# Patient Record
Sex: FEMALE | Race: BLACK OR AFRICAN AMERICAN | NOT HISPANIC OR LATINO | Employment: PART TIME | URBAN - METROPOLITAN AREA
[De-identification: names, ages, dates, MRNs, and addresses within clinical notes are randomized per-mention and may not be internally consistent; named-entity substitution may affect disease eponyms.]

---

## 2023-11-09 ENCOUNTER — TELEPHONE (OUTPATIENT)
Dept: OBSTETRICS AND GYNECOLOGY | Facility: CLINIC | Age: 28
End: 2023-11-09
Payer: COMMERCIAL

## 2023-11-09 NOTE — TELEPHONE ENCOUNTER
RN reached pt. Feels she may have a yeast infection . Advised she has an appointment tomorrow with a provider. May try OTC yeast medication if she prefers. Unable to send out rx for treatment until seen.

## 2023-11-09 NOTE — TELEPHONE ENCOUNTER
----- Message from Ashley Melendez sent at 11/9/2023  9:59 AM EST -----    ----- Message -----  From: Cheryl Tyson  Sent: 11/9/2023   8:49 AM EST  To: Conor Cow[facility]    Pt Baudilio called to request a RX for a yeast infection. She is up to date with her annual. Please call her @ [phone]. Thanks

## 2023-11-10 ENCOUNTER — OFFICE VISIT (OUTPATIENT)
Dept: OBSTETRICS AND GYNECOLOGY | Facility: CLINIC | Age: 28
End: 2023-11-10
Payer: COMMERCIAL

## 2023-11-10 ENCOUNTER — PHARMACY VISIT (OUTPATIENT)
Dept: PHARMACY | Facility: CLINIC | Age: 28
End: 2023-11-10
Payer: MEDICAID

## 2023-11-10 VITALS
DIASTOLIC BLOOD PRESSURE: 79 MMHG | SYSTOLIC BLOOD PRESSURE: 129 MMHG | HEIGHT: 65 IN | BODY MASS INDEX: 41.27 KG/M2 | WEIGHT: 247.7 LBS | HEART RATE: 67 BPM

## 2023-11-10 DIAGNOSIS — N76.0 BACTERIAL VAGINOSIS: ICD-10-CM

## 2023-11-10 DIAGNOSIS — B96.89 BACTERIAL VAGINOSIS: ICD-10-CM

## 2023-11-10 DIAGNOSIS — N89.8 VAGINAL DISCHARGE: Primary | ICD-10-CM

## 2023-11-10 PROCEDURE — 99213 OFFICE O/P EST LOW 20 MIN: CPT

## 2023-11-10 PROCEDURE — 87205 SMEAR GRAM STAIN: CPT

## 2023-11-10 PROCEDURE — RXMED WILLOW AMBULATORY MEDICATION CHARGE

## 2023-11-10 PROCEDURE — 1036F TOBACCO NON-USER: CPT

## 2023-11-10 PROCEDURE — 87661 TRICHOMONAS VAGINALIS AMPLIF: CPT

## 2023-11-10 PROCEDURE — 87800 DETECT AGNT MULT DNA DIREC: CPT

## 2023-11-10 RX ORDER — METRONIDAZOLE 500 MG/1
500 TABLET ORAL 2 TIMES DAILY
Qty: 14 TABLET | Refills: 0 | Status: SHIPPED | OUTPATIENT
Start: 2023-11-10 | End: 2023-11-17

## 2023-11-10 ASSESSMENT — PAIN SCALES - GENERAL: PAINLEVEL: 0-NO PAIN

## 2023-11-10 NOTE — PROGRESS NOTES
Assessment/Plan   Diagnoses and all orders for this visit:  Vaginal discharge  -     TRICH VAGINALIS, AMPLIFIED  -     C. Trachomatis / N. Gonorrhoeae, Amplified Detection  -     Vaginitis Gram Stain For Bacterial Vaginosis + Yeast  Bacterial vaginosis  -     metroNIDAZOLE (Flagyl) 500 mg tablet; Take 1 tablet (500 mg) by mouth 2 times a day for 7 days.    Treated based on presenting s/s in office today - will re-evaluate if needed once labs result .  Carmen Arias, MIKE-CNM    Subjective   Katiana Avery is a 27 y.o. female who complains of vaginal discomfort.    ROS  Positive for vaginal discharge and vaginal odor  All other systems negative       HPI:  Symptoms reported: burning, discharge, local irritation, odor, and vulvar itching  Onset: 5 day ago  Course: persistent  Progression: stayed the same    Helpful treatments: none  Unhelpful treatments tried: none    Sexual history reviewed with the patient.   STI exposures include none.   Patient reports previous history of the following STIs: chlamydia.    Objective   Physical Exam:   Physical Exam  Constitutional:       Appearance: Normal appearance.   Genitourinary:      Vulva and urethral meatus normal.      No lesions in the vagina.      Genitourinary Comments: Malodor present  - moderate amount of thin white discharge adherent to vaginal walls       Right Labia: No rash, tenderness, lesions or skin changes.     Left Labia: No tenderness, lesions, skin changes or rash.     Vaginal discharge present.      No vaginal erythema.      No cervical discharge, friability, lesion or polyp.      No urethral tenderness or discharge present.   Pulmonary:      Effort: Pulmonary effort is normal.   Neurological:      General: No focal deficit present.      Mental Status: She is alert and oriented to person, place, and time. Mental status is at baseline.   Skin:     General: Skin is warm and dry.   Psychiatric:         Mood and Affect: Mood normal.         Behavior: Behavior  normal.         Thought Content: Thought content normal.         Judgment: Judgment normal.   Vitals reviewed.

## 2023-11-11 LAB
C TRACH RRNA SPEC QL NAA+PROBE: NEGATIVE
CLUE CELLS VAG LPF-#/AREA: PRESENT /[LPF]
N GONORRHOEA DNA SPEC QL PROBE+SIG AMP: NEGATIVE
NUGENT SCORE: 9
T VAGINALIS RRNA SPEC QL NAA+PROBE: NEGATIVE
YEAST VAG WET PREP-#/AREA: ABNORMAL

## 2023-11-12 ASSESSMENT — ENCOUNTER SYMPTOMS: PSYCHIATRIC NEGATIVE: 0
